# Patient Record
Sex: MALE
[De-identification: names, ages, dates, MRNs, and addresses within clinical notes are randomized per-mention and may not be internally consistent; named-entity substitution may affect disease eponyms.]

---

## 2022-01-21 ENCOUNTER — NON-APPOINTMENT (OUTPATIENT)
Age: 72
End: 2022-01-21

## 2022-01-21 ENCOUNTER — APPOINTMENT (OUTPATIENT)
Dept: FAMILY MEDICINE | Facility: CLINIC | Age: 72
End: 2022-01-21
Payer: SELF-PAY

## 2022-01-21 VITALS
BODY MASS INDEX: 20.88 KG/M2 | OXYGEN SATURATION: 98 % | WEIGHT: 133 LBS | TEMPERATURE: 96.8 F | HEART RATE: 78 BPM | RESPIRATION RATE: 16 BRPM | SYSTOLIC BLOOD PRESSURE: 130 MMHG | DIASTOLIC BLOOD PRESSURE: 90 MMHG | HEIGHT: 67 IN

## 2022-01-21 VITALS — DIASTOLIC BLOOD PRESSURE: 70 MMHG | SYSTOLIC BLOOD PRESSURE: 140 MMHG

## 2022-01-21 DIAGNOSIS — Z76.89 PERSONS ENCOUNTERING HEALTH SERVICES IN OTHER SPECIFIED CIRCUMSTANCES: ICD-10-CM

## 2022-01-21 DIAGNOSIS — Z78.9 OTHER SPECIFIED HEALTH STATUS: ICD-10-CM

## 2022-01-21 DIAGNOSIS — N40.0 BENIGN PROSTATIC HYPERPLASIA WITHOUT LOWER URINARY TRACT SYMPMS: ICD-10-CM

## 2022-01-21 PROCEDURE — 93000 ELECTROCARDIOGRAM COMPLETE: CPT

## 2022-01-21 PROCEDURE — 99204 OFFICE O/P NEW MOD 45 MIN: CPT | Mod: 25

## 2022-01-21 NOTE — HISTORY OF PRESENT ILLNESS
[Other: _____] : [unfilled] [FreeTextEntry1] : Establish care [de-identified] : \par 71 year old male presents with his daughter to establish care\par \par Originally from [place]\par Moved to the [place] in 2009\par  and has 3 daughters \par retired \par \par previously followed with [facility] in [place] \par moving his care to our office as he lives in [place] \par \par recently has become more confused\par diagnosed with dementia\par following with both psychiatry and neurology\par also with depression\par on Abilify and Lexapro\par \par has HTN, used to take Losartan, HCTZ, but stopped medications on his own almost a year ago\par \par has Hyperlipidemia, used to take Pravastatin but stopped medication on his own\par \par has DM II, used to take Metformin, but stopped medication on his own \par \par has BPH, used to take Terazosin but stopped medication on his own

## 2022-01-21 NOTE — HEALTH RISK ASSESSMENT
[Never] : Never [No] : In the past 12 months have you used drugs other than those required for medical reasons? No [No falls in past year] : Patient reported no falls in the past year [Change in mental status noted] : Change in mental status noted [Learning/Retaining New Information] : difficulty learning/retaining new information [With Significant Other] : lives with significant other [With Family] : lives with family [Retired] : retired [] :  [# Of Children ___] : has [unfilled] children [Fully functional (bathing, dressing, toileting, transferring, walking, feeding)] : Fully functional (bathing, dressing, toileting, transferring, walking, feeding) [Independent] : doing laundry [Full assistance needed] : managing finances [Smoke Detector] : smoke detector [Carbon Monoxide Detector] : carbon monoxide detector [With Patient/Caregiver] : , with patient/caregiver [de-identified] : Walks  [de-identified] : Diet is generally good [AdvancecareDate] : 01/2022

## 2022-01-21 NOTE — PHYSICAL EXAM
[No Acute Distress] : no acute distress [Well Nourished] : well nourished [Well Developed] : well developed [Well-Appearing] : well-appearing [Normal Sclera/Conjunctiva] : normal sclera/conjunctiva [PERRL] : pupils equal round and reactive to light [Normal Outer Ear/Nose] : the outer ears and nose were normal in appearance [Normal TMs] : both tympanic membranes were normal [Normal Appearance] : was normal in appearance [Neck Supple] : was supple [Normal] : the thyroid was normal [No Respiratory Distress] : no respiratory distress  [Clear to Auscultation] : lungs were clear to auscultation bilaterally [Normal Rate] : normal rate  [Regular Rhythm] : with a regular rhythm [Normal S1, S2] : normal S1 and S2 [No Murmur] : no murmur heard [No Carotid Bruits] : no carotid bruits [Pedal Pulses Present] : the pedal pulses are present [No Edema] : there was no peripheral edema [Soft] : abdomen soft [Non Tender] : non-tender [Normal Bowel Sounds] : normal bowel sounds [Normal Posterior Cervical Nodes] : no posterior cervical lymphadenopathy [Normal Anterior Cervical Nodes] : no anterior cervical lymphadenopathy [No Spinal Tenderness] : no spinal tenderness [No Rash] : no rash [No Focal Deficits] : no focal deficits [Right Foot Was Examined] : Right foot ~C was examined [Left Foot Was Examined] : left foot ~C was examined [None] : no ulcers in either foot were found [de-identified] : Alert and Oriented x 1, appears confused at times

## 2022-01-21 NOTE — ASSESSMENT
[FreeTextEntry1] : 71 year old male presents as a new patient to establish care\par \par DM II\par - has been off of Metformin\par - check blood work, reassess \par - advised to see ophthalmology \par \par HTN\par - BP not at goal\par - restart Losartan 50 mg daily\par \par Dyslipidemia\par - check blood work, reassess \par \par BPH\par - off of Terazosin\par - no urinary complaints at this time\par - consider urology evaluation \par \par Dementia\par - following with neurology, psychiatry\par \par Depression\par - on Abilify, Lexapro\par - following with psychiatry \par \par Health care maintenance\par - believes vaccinations are up to date\par - reports having had a colonoscopy previously \par - sign release to obtain previous medical records \par \par advised to see cardiology for cardiac screening \par consider carotid ultrasound if not previously done \par \par

## 2022-01-24 LAB
25(OH)D3 SERPL-MCNC: 28.2 NG/ML
ALBUMIN SERPL ELPH-MCNC: 4.5 G/DL
ALP BLD-CCNC: 91 U/L
ALT SERPL-CCNC: 9 U/L
ANION GAP SERPL CALC-SCNC: 16 MMOL/L
AST SERPL-CCNC: 18 U/L
BACTERIA UR CULT: NORMAL
BASOPHILS # BLD AUTO: 0.06 K/UL
BASOPHILS NFR BLD AUTO: 0.8 %
BILIRUB SERPL-MCNC: 0.3 MG/DL
BUN SERPL-MCNC: 13 MG/DL
CALCIUM SERPL-MCNC: 9.4 MG/DL
CHLORIDE SERPL-SCNC: 99 MMOL/L
CHOLEST SERPL-MCNC: 204 MG/DL
CO2 SERPL-SCNC: 26 MMOL/L
CREAT SERPL-MCNC: 1.06 MG/DL
EOSINOPHIL # BLD AUTO: 0.11 K/UL
EOSINOPHIL NFR BLD AUTO: 1.5 %
ESTIMATED AVERAGE GLUCOSE: 120 MG/DL
FOLATE SERPL-MCNC: 16.4 NG/ML
GLUCOSE SERPL-MCNC: 100 MG/DL
HBA1C MFR BLD HPLC: 5.8 %
HCT VFR BLD CALC: 42.3 %
HDLC SERPL-MCNC: 63 MG/DL
HGB BLD-MCNC: 13.4 G/DL
IMM GRANULOCYTES NFR BLD AUTO: 0.3 %
LDLC SERPL CALC-MCNC: 126 MG/DL
LYMPHOCYTES # BLD AUTO: 2.11 K/UL
LYMPHOCYTES NFR BLD AUTO: 28.4 %
MAN DIFF?: NORMAL
MCHC RBC-ENTMCNC: 31.3 PG
MCHC RBC-ENTMCNC: 31.7 GM/DL
MCV RBC AUTO: 98.8 FL
MONOCYTES # BLD AUTO: 0.77 K/UL
MONOCYTES NFR BLD AUTO: 10.3 %
NEUTROPHILS # BLD AUTO: 4.37 K/UL
NEUTROPHILS NFR BLD AUTO: 58.7 %
NONHDLC SERPL-MCNC: 140 MG/DL
PLATELET # BLD AUTO: 294 K/UL
POTASSIUM SERPL-SCNC: 4.3 MMOL/L
PROT SERPL-MCNC: 7.8 G/DL
PSA SERPL-MCNC: 2.39 NG/ML
RBC # BLD: 4.28 M/UL
RBC # FLD: 14.3 %
SODIUM SERPL-SCNC: 141 MMOL/L
T4 FREE SERPL-MCNC: 1.1 NG/DL
TRIGL SERPL-MCNC: 73 MG/DL
TSH SERPL-ACNC: 0.82 UIU/ML
URATE SERPL-MCNC: 5.2 MG/DL
VIT B12 SERPL-MCNC: 406 PG/ML
WBC # FLD AUTO: 7.44 K/UL

## 2022-08-01 ENCOUNTER — RX RENEWAL (OUTPATIENT)
Age: 72
End: 2022-08-01

## 2022-09-23 ENCOUNTER — APPOINTMENT (OUTPATIENT)
Dept: FAMILY MEDICINE | Facility: CLINIC | Age: 72
End: 2022-09-23

## 2023-02-10 ENCOUNTER — APPOINTMENT (OUTPATIENT)
Dept: FAMILY MEDICINE | Facility: CLINIC | Age: 73
End: 2023-02-10
Payer: MEDICAID

## 2023-02-10 VITALS
SYSTOLIC BLOOD PRESSURE: 141 MMHG | OXYGEN SATURATION: 98 % | TEMPERATURE: 96.4 F | WEIGHT: 139 LBS | DIASTOLIC BLOOD PRESSURE: 82 MMHG | TEMPERATURE: 96.4 F | OXYGEN SATURATION: 98 % | HEART RATE: 57 BPM | WEIGHT: 139 LBS | RESPIRATION RATE: 16 BRPM | DIASTOLIC BLOOD PRESSURE: 82 MMHG | SYSTOLIC BLOOD PRESSURE: 141 MMHG | HEIGHT: 69 IN | BODY MASS INDEX: 20.59 KG/M2 | HEART RATE: 57 BPM | HEIGHT: 69 IN | BODY MASS INDEX: 20.59 KG/M2

## 2023-02-10 VITALS — DIASTOLIC BLOOD PRESSURE: 70 MMHG | SYSTOLIC BLOOD PRESSURE: 134 MMHG

## 2023-02-10 DIAGNOSIS — G47.00 INSOMNIA, UNSPECIFIED: ICD-10-CM

## 2023-02-10 PROCEDURE — 99214 OFFICE O/P EST MOD 30 MIN: CPT

## 2023-02-10 RX ORDER — VALPROIC ACID 250 MG/5ML
250 SOLUTION ORAL AT BEDTIME
Refills: 0 | Status: ACTIVE | COMMUNITY
Start: 2023-02-10

## 2023-02-10 RX ORDER — CHLORHEXIDINE GLUCONATE 4 %
5 LIQUID (ML) TOPICAL
Qty: 1 | Refills: 1 | Status: ACTIVE | COMMUNITY
Start: 2023-02-10

## 2023-02-10 RX ORDER — ATORVASTATIN CALCIUM 10 MG/1
10 TABLET, FILM COATED ORAL DAILY
Qty: 90 | Refills: 1 | Status: DISCONTINUED | COMMUNITY
Start: 2022-01-24 | End: 2023-02-10

## 2023-02-10 RX ORDER — ARIPIPRAZOLE 10 MG/1
10 TABLET ORAL
Refills: 0 | Status: DISCONTINUED | COMMUNITY
End: 2023-02-10

## 2023-02-10 RX ORDER — QUETIAPINE FUMARATE 25 MG/1
25 TABLET ORAL
Qty: 90 | Refills: 0 | Status: ACTIVE | COMMUNITY
Start: 2023-02-10

## 2023-02-10 RX ORDER — DONEPEZIL HYDROCHLORIDE 10 MG/1
10 TABLET ORAL DAILY
Refills: 0 | Status: ACTIVE | COMMUNITY
Start: 2023-02-10

## 2023-02-10 RX ORDER — ESCITALOPRAM OXALATE 20 MG/1
20 TABLET, FILM COATED ORAL
Refills: 0 | Status: DISCONTINUED | COMMUNITY
End: 2023-02-10

## 2023-02-28 LAB
25(OH)D3 SERPL-MCNC: 34.4 NG/ML
ALBUMIN SERPL ELPH-MCNC: 4.2 G/DL
ALP BLD-CCNC: 92 U/L
ALT SERPL-CCNC: 11 U/L
ANION GAP SERPL CALC-SCNC: 14 MMOL/L
AST SERPL-CCNC: 17 U/L
BASOPHILS # BLD AUTO: 0.05 K/UL
BASOPHILS NFR BLD AUTO: 1.1 %
BILIRUB SERPL-MCNC: 0.2 MG/DL
BUN SERPL-MCNC: 12 MG/DL
CALCIUM SERPL-MCNC: 9.5 MG/DL
CHLORIDE SERPL-SCNC: 102 MMOL/L
CHOLEST SERPL-MCNC: 152 MG/DL
CO2 SERPL-SCNC: 26 MMOL/L
CREAT SERPL-MCNC: 1.11 MG/DL
EGFR: 71 ML/MIN/1.73M2
EOSINOPHIL # BLD AUTO: 0.2 K/UL
EOSINOPHIL NFR BLD AUTO: 4.4 %
ESTIMATED AVERAGE GLUCOSE: 126 MG/DL
FOLATE SERPL-MCNC: 11.3 NG/ML
GLUCOSE SERPL-MCNC: 85 MG/DL
HBA1C MFR BLD HPLC: 6 %
HCT VFR BLD CALC: 41.6 %
HDLC SERPL-MCNC: 47 MG/DL
HGB BLD-MCNC: 13.3 G/DL
IMM GRANULOCYTES NFR BLD AUTO: 0.2 %
LDLC SERPL CALC-MCNC: 85 MG/DL
LYMPHOCYTES # BLD AUTO: 1.83 K/UL
LYMPHOCYTES NFR BLD AUTO: 40.3 %
MAN DIFF?: NORMAL
MCHC RBC-ENTMCNC: 32 GM/DL
MCHC RBC-ENTMCNC: 32 PG
MCV RBC AUTO: 100.2 FL
MONOCYTES # BLD AUTO: 0.44 K/UL
MONOCYTES NFR BLD AUTO: 9.7 %
NEUTROPHILS # BLD AUTO: 2.01 K/UL
NEUTROPHILS NFR BLD AUTO: 44.3 %
NONHDLC SERPL-MCNC: 105 MG/DL
PLATELET # BLD AUTO: 237 K/UL
POTASSIUM SERPL-SCNC: 4.3 MMOL/L
PROT SERPL-MCNC: 7.8 G/DL
RBC # BLD: 4.15 M/UL
RBC # FLD: 14.2 %
SODIUM SERPL-SCNC: 142 MMOL/L
T4 FREE SERPL-MCNC: 0.9 NG/DL
TRIGL SERPL-MCNC: 102 MG/DL
TSH SERPL-ACNC: 1.24 UIU/ML
URATE SERPL-MCNC: 4.7 MG/DL
VIT B12 SERPL-MCNC: 421 PG/ML
WBC # FLD AUTO: 4.54 K/UL

## 2023-02-28 NOTE — HISTORY OF PRESENT ILLNESS
[FreeTextEntry1] : Follow up [de-identified] : \par 72 year old male presents with his daughter (Aditi) for a follow up\par \par He was hospitalized at [facility] in November 2022 for weakness and likely polypharmacy \par his medications were adjusted and he was d/c to rehab\par he spent 1 month in rehab\par he is now back at home- lives there with his daughter and wife \par \par follows with psychiatric NP and Neurology (Dr. Dominguez) for dementia\par on Donepezil, Quetiapine, Depakote\par \par has HTN- on Losartan\par was also started on Carvedilol during his hospital stay to help with tachycardia \par \par has Hyperlipidemia- on Simvastatin \par \par has DM II- diet controlled\par \par has BPH, used to take Terazosin but stopped medication on his own \par \par up to date with Flu vaccine

## 2023-02-28 NOTE — ASSESSMENT
[FreeTextEntry1] : HTN\par - stable\par - c/w Losartan, Carvedilol\par \par Dyslipidemia\par - c/w Simvastatin\par - check blood work\par \par DM II\par - previously controlled\par - c/w diet and exercise as tolerated\par - check blood work\par \par Dementia/Depression\par - c/w management as per neurology and psychiatry\par - on Donepezil, Quetiapine, Depakote \par \par recommend patient see cardiology for a cardiac evaluation given h/o HTN, Dyslipidemia, DM II

## 2023-02-28 NOTE — PHYSICAL EXAM
[No Acute Distress] : no acute distress [Well Nourished] : well nourished [Well Developed] : well developed [Well-Appearing] : well-appearing [Normal Appearance] : was normal in appearance [Neck Supple] : was supple [No Respiratory Distress] : no respiratory distress  [Clear to Auscultation] : lungs were clear to auscultation bilaterally [Normal Rate] : normal rate  [Regular Rhythm] : with a regular rhythm [Normal S1, S2] : normal S1 and S2 [No Murmur] : no murmur heard [No Edema] : there was no peripheral edema [de-identified] : Alert and Oriented x 1 (person)

## 2023-07-24 ENCOUNTER — NON-APPOINTMENT (OUTPATIENT)
Age: 73
End: 2023-07-24

## 2023-07-24 ENCOUNTER — APPOINTMENT (OUTPATIENT)
Dept: FAMILY MEDICINE | Facility: CLINIC | Age: 73
End: 2023-07-24
Payer: MEDICAID

## 2023-07-24 VITALS
HEART RATE: 67 BPM | RESPIRATION RATE: 16 BRPM | WEIGHT: 141.5 LBS | SYSTOLIC BLOOD PRESSURE: 160 MMHG | OXYGEN SATURATION: 98 % | BODY MASS INDEX: 20.96 KG/M2 | TEMPERATURE: 98.3 F | HEIGHT: 69 IN | DIASTOLIC BLOOD PRESSURE: 90 MMHG

## 2023-07-24 VITALS — DIASTOLIC BLOOD PRESSURE: 70 MMHG | SYSTOLIC BLOOD PRESSURE: 140 MMHG

## 2023-07-24 DIAGNOSIS — Z11.1 ENCOUNTER FOR SCREENING FOR RESPIRATORY TUBERCULOSIS: ICD-10-CM

## 2023-07-24 DIAGNOSIS — Z00.00 ENCOUNTER FOR GENERAL ADULT MEDICAL EXAMINATION W/OUT ABNORMAL FINDINGS: ICD-10-CM

## 2023-07-24 DIAGNOSIS — R94.31 ABNORMAL ELECTROCARDIOGRAM [ECG] [EKG]: ICD-10-CM

## 2023-07-24 PROCEDURE — 93000 ELECTROCARDIOGRAM COMPLETE: CPT

## 2023-07-24 PROCEDURE — 99397 PER PM REEVAL EST PAT 65+ YR: CPT | Mod: 25

## 2023-07-24 PROCEDURE — 99214 OFFICE O/P EST MOD 30 MIN: CPT | Mod: 25

## 2023-08-01 LAB
ALBUMIN SERPL ELPH-MCNC: 4 G/DL
ALP BLD-CCNC: 73 U/L
ALT SERPL-CCNC: 6 U/L
ANION GAP SERPL CALC-SCNC: 11 MMOL/L
AST SERPL-CCNC: 17 U/L
BASOPHILS # BLD AUTO: 0.05 K/UL
BASOPHILS NFR BLD AUTO: 0.9 %
BILIRUB SERPL-MCNC: 0.2 MG/DL
BUN SERPL-MCNC: 13 MG/DL
CALCIUM SERPL-MCNC: 9 MG/DL
CHLORIDE SERPL-SCNC: 102 MMOL/L
CHOLEST SERPL-MCNC: 139 MG/DL
CO2 SERPL-SCNC: 28 MMOL/L
CREAT SERPL-MCNC: 1.33 MG/DL
EGFR: 56 ML/MIN/1.73M2
EOSINOPHIL # BLD AUTO: 0.18 K/UL
EOSINOPHIL NFR BLD AUTO: 3.3 %
ESTIMATED AVERAGE GLUCOSE: 126 MG/DL
GLUCOSE SERPL-MCNC: 121 MG/DL
HBA1C MFR BLD HPLC: 6 %
HCT VFR BLD CALC: 40.5 %
HDLC SERPL-MCNC: 37 MG/DL
HGB BLD-MCNC: 12.7 G/DL
IMM GRANULOCYTES NFR BLD AUTO: 0.2 %
LDLC SERPL CALC-MCNC: 84 MG/DL
LYMPHOCYTES # BLD AUTO: 2.11 K/UL
LYMPHOCYTES NFR BLD AUTO: 38.9 %
M TB IFN-G BLD-IMP: NEGATIVE
MAN DIFF?: NORMAL
MCHC RBC-ENTMCNC: 31.4 GM/DL
MCHC RBC-ENTMCNC: 32.2 PG
MCV RBC AUTO: 102.5 FL
MONOCYTES # BLD AUTO: 0.63 K/UL
MONOCYTES NFR BLD AUTO: 11.6 %
NEUTROPHILS # BLD AUTO: 2.45 K/UL
NEUTROPHILS NFR BLD AUTO: 45.1 %
NONHDLC SERPL-MCNC: 102 MG/DL
PLATELET # BLD AUTO: 216 K/UL
POTASSIUM SERPL-SCNC: 4.3 MMOL/L
PROT SERPL-MCNC: 7.3 G/DL
QUANTIFERON TB PLUS MITOGEN MINUS NIL: 7.68 IU/ML
QUANTIFERON TB PLUS NIL: 0.02 IU/ML
QUANTIFERON TB PLUS TB1 MINUS NIL: 0 IU/ML
QUANTIFERON TB PLUS TB2 MINUS NIL: 0.02 IU/ML
RBC # BLD: 3.95 M/UL
RBC # FLD: 14.7 %
SODIUM SERPL-SCNC: 142 MMOL/L
TRIGL SERPL-MCNC: 97 MG/DL
WBC # FLD AUTO: 5.43 K/UL

## 2023-08-09 PROBLEM — Z11.1 SCREENING-PULMONARY TB: Status: ACTIVE | Noted: 2023-07-24

## 2023-08-09 PROBLEM — R94.31 ABNORMAL EKG: Status: ACTIVE | Noted: 2023-07-24

## 2023-08-09 NOTE — ASSESSMENT
[FreeTextEntry1] : Health care maintenance - check blood work, blood drawn in office - follow up with optometry/ophthalmology and dentist for routine exams when due - c/w diet and improve upon exercise as tolerated - reports up to date with Pneumonia vaccination  - reports up to date with colonoscopy   HTN - stable - c/w Losartan, Carvedilol  Dyslipidemia - stable - c/w Simvastatin - check blood work  DM II - controlled - c/w diet  - check blood work  Dementia/Depression - c/w management as per neurology and psychiatry - on Donepezil, Quetiapine, Depakote   Abnormal EKG - referred to cardiology   blood drawn for Quantiferon TB Plus  will look to complete forms

## 2023-08-09 NOTE — HEALTH RISK ASSESSMENT
[No] : In the past 12 months have you used drugs other than those required for medical reasons? No [Never] : Never [No falls in past year] : Patient reported no falls in the past year [Patient reported colonoscopy was normal] : Patient reported colonoscopy was normal [Learning/Retaining New Information] : difficulty learning/retaining new information [With Family] : lives with family [Retired] : retired [] :  [# Of Children ___] : has [unfilled] children [Independent] : feeding [Some assistance needed] : using telephone [Full assistance needed] : managing finances [Smoke Detector] : smoke detector [Carbon Monoxide Detector] : carbon monoxide detector [Seat Belt] :  uses seat belt [With Patient/Caregiver] : , with patient/caregiver [Designated Healthcare Proxy] : Designated healthcare proxy [Name: ___] : Health Care Proxy's Name: [unfilled]  [Relationship: ___] : Relationship: [unfilled] [de-identified] : Not exercising regularly  [de-identified] : Diet is generally good- has to be reminded to eat due to dementia  [Reports changes in hearing] : Reports no changes in hearing [Reports changes in vision] : Reports no changes in vision [ColonoscopyDate] : 11/2022 [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

## 2023-08-09 NOTE — PHYSICAL EXAM
[No Acute Distress] : no acute distress [Well Nourished] : well nourished [Well Developed] : well developed [Well-Appearing] : well-appearing [Normal Sclera/Conjunctiva] : normal sclera/conjunctiva [PERRL] : pupils equal round and reactive to light [Normal Oropharynx] : the oropharynx was normal [Normal TMs] : both tympanic membranes were normal [Normal Appearance] : was normal in appearance [Neck Supple] : was supple [Normal] : the thyroid was normal [No Respiratory Distress] : no respiratory distress  [Clear to Auscultation] : lungs were clear to auscultation bilaterally [Normal Rate] : normal rate  [Regular Rhythm] : with a regular rhythm [Normal S1, S2] : normal S1 and S2 [No Murmur] : no murmur heard [No Carotid Bruits] : no carotid bruits [No Edema] : there was no peripheral edema [Soft] : abdomen soft [Non Tender] : non-tender [Normal Bowel Sounds] : normal bowel sounds [Normal Posterior Cervical Nodes] : no posterior cervical lymphadenopathy [Normal Anterior Cervical Nodes] : no anterior cervical lymphadenopathy [No Spinal Tenderness] : no spinal tenderness [Grossly Normal Strength/Tone] : grossly normal strength/tone [No Rash] : no rash [No Focal Deficits] : no focal deficits [Appropriate] : appropriate [de-identified] : Alert and Oriented x 1 (person)

## 2023-08-09 NOTE — HISTORY OF PRESENT ILLNESS
[FreeTextEntry1] : Annual physical  [de-identified] : 73 year old male presents alongside his daughter, Aditi, for an annual physical   follows with psychiatric NP and Neurology (Dr. Dominguez) for dementia on Donepezil, Quetiapine, Depakote  has HTN- on Losartan and Carvedilol   has Hyperlipidemia- on Simvastatin   has DM II- diet controlled  has BPH, off of medication   patient is looking to join an adult day program needs forms completed  needs TB screening   reports up to date with Pneumonia vaccination- given during rehab stay in December 2022

## 2023-11-02 ENCOUNTER — APPOINTMENT (OUTPATIENT)
Dept: CARDIOLOGY | Facility: CLINIC | Age: 73
End: 2023-11-02

## 2023-12-01 RX ORDER — CARVEDILOL 6.25 MG/1
6.25 TABLET, FILM COATED ORAL
Qty: 180 | Refills: 0 | Status: ACTIVE | COMMUNITY
Start: 2023-02-10 | End: 1900-01-01

## 2023-12-01 RX ORDER — LOSARTAN POTASSIUM 50 MG/1
50 TABLET, FILM COATED ORAL
Qty: 90 | Refills: 0 | Status: ACTIVE | COMMUNITY
Start: 2022-01-21 | End: 1900-01-01

## 2023-12-01 RX ORDER — SIMVASTATIN 10 MG/1
10 TABLET, FILM COATED ORAL
Qty: 90 | Refills: 0 | Status: ACTIVE | COMMUNITY
Start: 2023-02-10 | End: 1900-01-01

## 2024-01-08 ENCOUNTER — APPOINTMENT (OUTPATIENT)
Dept: FAMILY MEDICINE | Facility: CLINIC | Age: 74
End: 2024-01-08
Payer: MEDICAID

## 2024-01-08 VITALS — SYSTOLIC BLOOD PRESSURE: 148 MMHG | DIASTOLIC BLOOD PRESSURE: 70 MMHG

## 2024-01-08 VITALS
WEIGHT: 140 LBS | HEART RATE: 63 BPM | BODY MASS INDEX: 20.73 KG/M2 | DIASTOLIC BLOOD PRESSURE: 84 MMHG | OXYGEN SATURATION: 95 % | HEIGHT: 69 IN | SYSTOLIC BLOOD PRESSURE: 130 MMHG | TEMPERATURE: 97.2 F

## 2024-01-08 DIAGNOSIS — E11.9 TYPE 2 DIABETES MELLITUS W/OUT COMPLICATIONS: ICD-10-CM

## 2024-01-08 DIAGNOSIS — I10 ESSENTIAL (PRIMARY) HYPERTENSION: ICD-10-CM

## 2024-01-08 DIAGNOSIS — Z02.89 ENCOUNTER FOR OTHER ADMINISTRATIVE EXAMINATIONS: ICD-10-CM

## 2024-01-08 DIAGNOSIS — E78.5 HYPERLIPIDEMIA, UNSPECIFIED: ICD-10-CM

## 2024-01-08 DIAGNOSIS — F03.90 UNSPECIFIED DEMENTIA W/OUT BEHAVIORAL DISTURBANCE: ICD-10-CM

## 2024-01-08 DIAGNOSIS — F32.A DEPRESSION, UNSPECIFIED: ICD-10-CM

## 2024-01-08 PROCEDURE — 99214 OFFICE O/P EST MOD 30 MIN: CPT

## 2024-01-08 NOTE — PHYSICAL EXAM
[No Acute Distress] : no acute distress [Well Nourished] : well nourished [Well Developed] : well developed [Normal Appearance] : was normal in appearance [Neck Supple] : was supple [No Respiratory Distress] : no respiratory distress  [Clear to Auscultation] : lungs were clear to auscultation bilaterally [Normal Rate] : normal rate  [Regular Rhythm] : with a regular rhythm [Normal S1, S2] : normal S1 and S2 [No Murmur] : no murmur heard [No Edema] : there was no peripheral edema [de-identified] : Alert

## 2024-01-08 NOTE — HISTORY OF PRESENT ILLNESS
[FreeTextEntry1] : Follow up [de-identified] : 73 year old male presents alongside his daughter, Aditi, for a follow up  follows with psychiatric NP and Neurology (Dr. Dominguez) for dementia on Donepezil, Quetiapine, Depakote, Melatonin daughter is concerned as he does not sleep well- has tried to speak to his specialists, no adjustment has been made to his medication regimen  has HTN- on Losartan and Carvedilol  daughter reports home BP readings have been stable  has Hyperlipidemia- on Simvastatin   has DM II- diet controlled  has BPH, off of medication  declines PSA testing   reports up to date with Pneumonia vaccination- given during rehab stay in December 2022 up to date with Flu vaccine  updated forms need to be completed for patient to receive home care services  patient needs assistance due to his dementia

## 2024-01-08 NOTE — ASSESSMENT
[FreeTextEntry1] : Dementia/Depression - advised patient's daughter to reach out to patient's neurologist and/or psychiatric NP regarding medication regimen and sleep issues - referred to behavioral health counselor so patient's daughter can see if patient can be connected to an alternative psychiatrist  - on Donepezil, Quetiapine, Depakote, Melatonin currently   HTN - BP elevated today, not at goal  - daughter reports home BP readings have been stable  - c/w Losartan, Carvedilol - monitor   Dyslipidemia - stable - c/w Simvastatin - check blood work  DM II - controlled - c/w diet - check blood work  will look to complete forms

## 2024-01-10 LAB
ALBUMIN SERPL ELPH-MCNC: 4.2 G/DL
ALP BLD-CCNC: 74 U/L
ALT SERPL-CCNC: 10 U/L
ANION GAP SERPL CALC-SCNC: 9 MMOL/L
AST SERPL-CCNC: 18 U/L
BASOPHILS # BLD AUTO: 0.04 K/UL
BASOPHILS NFR BLD AUTO: 0.8 %
BILIRUB SERPL-MCNC: 0.2 MG/DL
BUN SERPL-MCNC: 14 MG/DL
CALCIUM SERPL-MCNC: 9.2 MG/DL
CHLORIDE SERPL-SCNC: 105 MMOL/L
CHOLEST SERPL-MCNC: 157 MG/DL
CO2 SERPL-SCNC: 31 MMOL/L
CREAT SERPL-MCNC: 1.09 MG/DL
EGFR: 72 ML/MIN/1.73M2
EOSINOPHIL # BLD AUTO: 0.12 K/UL
EOSINOPHIL NFR BLD AUTO: 2.4 %
ESTIMATED AVERAGE GLUCOSE: 131 MG/DL
FOLATE SERPL-MCNC: 14 NG/ML
GLUCOSE SERPL-MCNC: 111 MG/DL
HBA1C MFR BLD HPLC: 6.2 %
HCT VFR BLD CALC: 39.7 %
HDLC SERPL-MCNC: 49 MG/DL
HGB BLD-MCNC: 12.7 G/DL
IMM GRANULOCYTES NFR BLD AUTO: 0.2 %
LDLC SERPL CALC-MCNC: 84 MG/DL
LYMPHOCYTES # BLD AUTO: 2.23 K/UL
LYMPHOCYTES NFR BLD AUTO: 44.5 %
MAN DIFF?: NORMAL
MCHC RBC-ENTMCNC: 32 GM/DL
MCHC RBC-ENTMCNC: 33.4 PG
MCV RBC AUTO: 104.5 FL
MONOCYTES # BLD AUTO: 0.57 K/UL
MONOCYTES NFR BLD AUTO: 11.4 %
NEUTROPHILS # BLD AUTO: 2.04 K/UL
NEUTROPHILS NFR BLD AUTO: 40.7 %
NONHDLC SERPL-MCNC: 108 MG/DL
PLATELET # BLD AUTO: 172 K/UL
POTASSIUM SERPL-SCNC: 4.1 MMOL/L
PROT SERPL-MCNC: 7.2 G/DL
RBC # BLD: 3.8 M/UL
RBC # FLD: 14.2 %
SODIUM SERPL-SCNC: 145 MMOL/L
TRIGL SERPL-MCNC: 135 MG/DL
TSH SERPL-ACNC: 1.26 UIU/ML
VIT B12 SERPL-MCNC: 360 PG/ML
WBC # FLD AUTO: 5.01 K/UL